# Patient Record
Sex: FEMALE | Race: WHITE | NOT HISPANIC OR LATINO | Employment: FULL TIME | RURAL
[De-identification: names, ages, dates, MRNs, and addresses within clinical notes are randomized per-mention and may not be internally consistent; named-entity substitution may affect disease eponyms.]

---

## 2019-05-29 ENCOUNTER — HISTORICAL (OUTPATIENT)
Dept: ADMINISTRATIVE | Facility: HOSPITAL | Age: 29
End: 2019-05-29

## 2019-05-31 LAB
LAB AP CLINICAL INFORMATION: NORMAL
LAB AP GENERAL CAT - HISTORICAL: NORMAL
LAB AP INTERPRETATION/RESULT - HISTORICAL: NEGATIVE
LAB AP SPECIMEN ADEQUACY - HISTORICAL: NORMAL
LAB AP SPECIMEN SUBMITTED - HISTORICAL: NORMAL

## 2023-12-03 ENCOUNTER — HOSPITAL ENCOUNTER (EMERGENCY)
Facility: HOSPITAL | Age: 33
Discharge: HOME | End: 2023-12-03

## 2023-12-03 VITALS
RESPIRATION RATE: 18 BRPM | SYSTOLIC BLOOD PRESSURE: 145 MMHG | TEMPERATURE: 98 F | BODY MASS INDEX: 26.49 KG/M2 | DIASTOLIC BLOOD PRESSURE: 79 MMHG | OXYGEN SATURATION: 100 % | HEIGHT: 67 IN | WEIGHT: 168.81 LBS | HEART RATE: 78 BPM

## 2023-12-03 DIAGNOSIS — B36.9 FUNGAL SKIN INFECTION: Primary | ICD-10-CM

## 2023-12-03 PROCEDURE — 99284 EMERGENCY DEPT VISIT MOD MDM: CPT | Mod: ,,, | Performed by: NURSE PRACTITIONER

## 2023-12-03 PROCEDURE — 99283 EMERGENCY DEPT VISIT LOW MDM: CPT

## 2023-12-03 PROCEDURE — 99284 PR EMERGENCY DEPT VISIT,LEVEL IV: ICD-10-PCS | Mod: ,,, | Performed by: NURSE PRACTITIONER

## 2023-12-03 RX ORDER — DOXYLAMINE SUCCINATE 25 MG
TABLET ORAL 2 TIMES DAILY
Qty: 30 G | Refills: 0 | Status: SHIPPED | OUTPATIENT
Start: 2023-12-03

## 2023-12-03 RX ORDER — FLUOXETINE HYDROCHLORIDE 20 MG/1
20 CAPSULE ORAL
COMMUNITY
Start: 2023-07-14

## 2023-12-03 RX ORDER — PREDNISONE 20 MG/1
TABLET ORAL
COMMUNITY
Start: 2023-11-17 | End: 2023-12-03

## 2023-12-03 RX ORDER — FLUCONAZOLE 100 MG/1
100 TABLET ORAL
COMMUNITY
Start: 2023-11-17 | End: 2023-12-03

## 2023-12-03 RX ORDER — DOXYLAMINE SUCCINATE 25 MG
TABLET ORAL 2 TIMES DAILY
Qty: 30 G | Refills: 0 | Status: SHIPPED | OUTPATIENT
Start: 2023-12-03 | End: 2023-12-03 | Stop reason: SDUPTHER

## 2023-12-03 NOTE — DISCHARGE INSTRUCTIONS
Follow up with your primary care provider in 2-3  days to discuss continued outbreak and possible need to see dermatology.  Return for emergency needs.

## 2023-12-03 NOTE — ED PROVIDER NOTES
Encounter Date: 12/3/2023       History     Chief Complaint   Patient presents with    Rash     C/o rash all over body x2 weeks. States she has been seen in clinic for same. Prescribed decadron and anti-fungal meds with no relief. States the areas are worse. Noted red areas with raised bumps. States she has been losing hair and feeling unwell for a few months.      Ms Ash presents to the ED with c/o rash to face and bilateral arms.  Saw PCP and has taking diflucan and medrol dose pack with no relief.      The history is provided by the patient.   Rash   This is a recurrent problem. Associated with: unsure. Associated symptoms comments: Round red area consistent with ring worm. She has tried steriods (anti fungal) for the symptoms.     Review of patient's allergies indicates:   Allergen Reactions    Penicillins Hives     unknown     History reviewed. No pertinent past medical history.  History reviewed. No pertinent surgical history.  History reviewed. No pertinent family history.     Review of Systems   Constitutional: Negative.    Respiratory: Negative.     Skin:  Positive for rash.       Physical Exam     Initial Vitals [12/03/23 1532]   BP Pulse Resp Temp SpO2   (!) 145/79 78 18 98.1 °F (36.7 °C) 100 %      MAP       --         Physical Exam    Constitutional: She appears well-developed and well-nourished.   Cardiovascular:  Normal rate and regular rhythm.           Pulmonary/Chest: Breath sounds normal.     Neurological: She is alert and oriented to person, place, and time.   Skin: Skin is warm and dry. Rash noted.   Round raised red areas to bilateral arms and face         Medical Screening Exam   See Full Note    ED Course   Procedures  Labs Reviewed - No data to display       Imaging Results    None          Medications - No data to display  Medical Decision Making  Ms Ash presents to the ED with c/o rash to face and bilateral arms.  Saw PCP and has taking diflucan and medrol dose pack with no  relief.        Risk  OTC drugs.                                      Clinical Impression:   Final diagnoses:  [B36.9] Fungal skin infection (Primary)        ED Disposition Condition    Discharge Stable          ED Prescriptions       Medication Sig Dispense Start Date End Date Auth. Provider    miconazole (MICOTIN) 2 % cream  (Status: Discontinued) Apply topically 2 (two) times daily. 30 g 12/3/2023 12/3/2023 Patricia Christian FNP    miconazole (MICOTIN) 2 % cream Apply topically 2 (two) times daily. 30 g 12/3/2023 -- Patricia Christian FNP          Follow-up Information       Follow up With Specialties Details Why Contact Info    Nora Green FNP Family Medicine Go in 3 days  [address] [facility] [place] [address] [phone]               Patricia Christian FNP  12/03/23 5912